# Patient Record
(demographics unavailable — no encounter records)

---

## 2024-12-30 NOTE — HISTORY OF PRESENT ILLNESS
[FreeTextEntry1] : Annual Physical  [de-identified] : 48 y/o M with h/o HLD - on diet mod , no meds  is requesting to be cked for RA / lupus - mom had h/o lupus and RA  pt reports  shoulder / neck pain  is currently on doxy for bronchitis - feels symptoms are improving  CXR was negative for PNA but had abnormality ? pt unclear ? right diaphragm elevated   Colonoscopy - never  is utd on all vaccines as per pt

## 2024-12-30 NOTE — HEALTH RISK ASSESSMENT
[Very Good] : ~his/her~  mood as very good [Yes] : Yes [No falls in past year] : Patient reported no falls in the past year [Little interest or pleasure doing things] : 1) Little interest or pleasure doing things [Feeling down, depressed, or hopeless] : 2) Feeling down, depressed, or hopeless [0] : 2) Feeling down, depressed, or hopeless: Not at all (0) [PHQ-2 Negative - No further assessment needed] : PHQ-2 Negative - No further assessment needed [HIV Test offered] : HIV Test offered [Hepatitis C test offered] : Hepatitis C test offered [None] : None [With Family] : lives with family [Employed] : employed [] :  [Sexually Active] : sexually active [Feels Safe at Home] : Feels safe at home [Fully functional (bathing, dressing, toileting, transferring, walking, feeding)] : Fully functional (bathing, dressing, toileting, transferring, walking, feeding) [Fully functional (using the telephone, shopping, preparing meals, housekeeping, doing laundry, using] : Fully functional and needs no help or supervision to perform IADLs (using the telephone, shopping, preparing meals, housekeeping, doing laundry, using transportation, managing medications and managing finances) [Smoke Detector] : smoke detector [Carbon Monoxide Detector] : carbon monoxide detector [Safety elements used in home] : safety elements used in home [Seat Belt] :  uses seat belt [Sunscreen] : uses sunscreen [Never] : Never [YES] : Yes [Are there any firearms stored in your household that are loaded?] : There are firearms stored in the household loaded. [Are there any children in your household?] : There are children in the household. [Have you attended a firearm safety workshop or class?] : A firearm safety workshop or class has been attended. [de-identified] : social [de-identified] : active [de-identified] : could be better  [Change in mental status noted] : No change in mental status noted [Language] : denies difficulty with language [Handling Complex Tasks] : denies difficulty handling complex tasks [Reports changes in hearing] : Reports no changes in hearing [Reports changes in vision] : Reports no changes in vision [Travel to Developing Areas] : does not  travel to developing areas [TB Exposure] : is not being exposed to tuberculosis [ColonoscopyComments] : never  [Are there any unlocked firearms stored in your household?] : No unlocked firearms in the household. [Has anyone in the household been feeling low/depressed/been struggling?] : No one in the household has been feeling low/depressed/been struggling.

## 2024-12-30 NOTE — PLAN
[FreeTextEntry1] :    Annual Physical:  Counseled on dietary modification, daily regular exercise.  Increase fiber/ plant-based diet and decrease intake of meats and animal-based diet.  To routinely use seat belts. Self-testicular and skin exams advised.  Immunizations discussed and counseled. Age-appropriate screening exams discussed. URI resolving, await CXR done in [facility]  f/u after labs

## 2025-01-22 NOTE — PLAN
Addended by: ASAF PENALOZA on: 1/21/2020 10:33 AM     Modules accepted: Orders     [FreeTextEntry1] : A/P:  labs d/w pt  HLD:  Advised on strict low-fat diet, daily regular exercise, declines to take meds, FLP/LFT to be monitored.  To recheck in 2 months and reassess. Prediabetes/glucosuria: Advised on strict dietary modification, repeat UA, patient is asymptomatic, to recheck and reassess in 2 months. Vitamin D deficiency: To take 1000 IUs daily OTC Elevated hemidiaphragm seen on CXR: Patient asymptomatic, to check liver ultrasound rule out hepatomegaly. Elevated LFTs:  Advised to avoid NSAIDS, alcohol and Tylenol. ? Fatty liver, to [facility] labs as ordered and liver ultrasound. Daily regular exercise and to lose weight. Follow-up 2 months/sooner if needed.

## 2025-01-22 NOTE — HISTORY OF PRESENT ILLNESS
[FreeTextEntry1] : f/u HLD / discuss labs  [de-identified] : 50 y/o M with h/o HLD - on diet mod , no meds  Here to discuss labs, no new complaints  Colonoscopy - never - has apt  is utd on all vaccines as per pt

## 2025-02-28 NOTE — HISTORY OF PRESENT ILLNESS
[FreeTextEntry1] : 51 yo M with HLD who presents for discussion of CRC screening.   Patient has never had a colonoscopy before.  Does report that his children have had colonoscopies for evaluation of other things.   Patient denies nausea, vomiting, diarrhea, constipation, abdominal pain.  Also denies coffee-ground emesis, hematemesis, hematochezia, melena.  Denies prior travel.  Denies weight loss.  Denies family history of malignancy.

## 2025-02-28 NOTE — ASSESSMENT
[FreeTextEntry1] :  Discussed risks and benefits of procedure including infection, bleeding, perforation.  Patient is in agreement with plan for procedure.  Preparation sent to pharmacy.